# Patient Record
Sex: MALE | Race: WHITE | Employment: FULL TIME | URBAN - METROPOLITAN AREA
[De-identification: names, ages, dates, MRNs, and addresses within clinical notes are randomized per-mention and may not be internally consistent; named-entity substitution may affect disease eponyms.]

---

## 2018-03-15 ENCOUNTER — OFFICE VISIT (OUTPATIENT)
Dept: FAMILY MEDICINE CLINIC | Facility: CLINIC | Age: 28
End: 2018-03-15

## 2018-03-15 VITALS
DIASTOLIC BLOOD PRESSURE: 60 MMHG | BODY MASS INDEX: 39.2 KG/M2 | WEIGHT: 280 LBS | HEIGHT: 71 IN | TEMPERATURE: 101 F | HEART RATE: 108 BPM | SYSTOLIC BLOOD PRESSURE: 118 MMHG | RESPIRATION RATE: 20 BRPM | OXYGEN SATURATION: 97 %

## 2018-03-15 DIAGNOSIS — J03.90 TONSILLITIS WITH EXUDATE: Primary | ICD-10-CM

## 2018-03-15 DIAGNOSIS — J02.9 SORE THROAT: ICD-10-CM

## 2018-03-15 LAB — CONTROL LINE PRESENT WITH A CLEAR BACKGROUND (YES/NO): YES YES/NO

## 2018-03-15 PROCEDURE — 99213 OFFICE O/P EST LOW 20 MIN: CPT | Performed by: NURSE PRACTITIONER

## 2018-03-15 PROCEDURE — 87081 CULTURE SCREEN ONLY: CPT | Performed by: NURSE PRACTITIONER

## 2018-03-15 PROCEDURE — 87880 STREP A ASSAY W/OPTIC: CPT | Performed by: NURSE PRACTITIONER

## 2018-03-15 NOTE — PROGRESS NOTES
CHIEF COMPLAINT:   Patient presents with:  Sore Throat: x 3 days        HPI:   Gerry Haro is a 24year old male presents to clinic with complaint of sore throat. Patient has had 3 days. Symptoms have been worsening since onset.   Patient reports fol SKIN: no rashes,no suspicious lesions  HEAD: atraumatic, normocephalic  EYES: conjunctiva clear, EOM intact  EARS: TM's clear, non-injected, no bulging, retraction, or fluid bilaterally  NOSE: nostrils patent, no nasal discharge, nasal mucosa pink and not Follow up in 3-5 days for worsening symptoms or no improvement    Self-Care for Sore Throats    Sore throats happen for many reasons, such as colds, allergies, and infections caused by viruses or bacteria. In any case, your throat becomes red and sore.  You Call your healthcare provider  Contact your healthcare provider if you have:  · A temperature over 101°F (38.3°C)  · White spots on the throat  · Great difficulty swallowing  · Trouble breathing  · A skin rash  · Recent exposure to someone else with strep

## 2018-12-14 NOTE — PATIENT INSTRUCTIONS
Tylenol/ibuprofen  Follow up in 3-5 days for worsening symptoms or no improvement    Self-Care for Sore Throats    Sore throats happen for many reasons, such as colds, allergies, and infections caused by viruses or bacteria.  In any case, your throat beco · Don’t strain your vocal cords.   Call your healthcare provider  Contact your healthcare provider if you have:  · A temperature over 101°F (38.3°C)  · White spots on the throat  · Great difficulty swallowing  · Trouble breathing  · A skin rash  · Recent ex Yes

## 2019-01-08 ENCOUNTER — OFFICE VISIT (OUTPATIENT)
Dept: FAMILY MEDICINE CLINIC | Facility: CLINIC | Age: 29
End: 2019-01-08

## 2019-01-08 VITALS
RESPIRATION RATE: 16 BRPM | WEIGHT: 280 LBS | HEART RATE: 108 BPM | OXYGEN SATURATION: 97 % | BODY MASS INDEX: 39.2 KG/M2 | TEMPERATURE: 100 F | SYSTOLIC BLOOD PRESSURE: 120 MMHG | DIASTOLIC BLOOD PRESSURE: 64 MMHG | HEIGHT: 71 IN

## 2019-01-08 DIAGNOSIS — J11.1 INFLUENZA-LIKE ILLNESS: Primary | ICD-10-CM

## 2019-01-08 PROCEDURE — 99213 OFFICE O/P EST LOW 20 MIN: CPT | Performed by: NURSE PRACTITIONER

## 2019-01-08 NOTE — PROGRESS NOTES
Patient presents with:  Headache: slight cough, feels \"feverish\", fatigue, chills/sweats (day 3)  : HPI:   Toone December is a 34year old male who presents for upper respiratory symptoms since yesterday morning. Started suddenly.   Symptoms have sli NOSE: nostrils patent, clear nasal mucous, nasal mucosa mildly inflamed  THROAT: oral mucosa pink, moist. No visible dental caries. Posterior pharynx is mildly erythematous. no exudates.   NECK: supple, non-tender  LUNGS: clear to auscultation bilaterally, Symptoms of the flu may be mild or severe. They can include extreme tiredness (wanting to stay in bed all day), chills, fevers, muscle aches, soreness with eye movement, headache, and a dry, hacking cough.   Home care  Follow these guidelines when caring fo · Severe weakness or dizziness  · You get a new fever or cough after getting better for a few days  Date Last Reviewed: 1/1/2017  © 9517-6764 [employer]. [address] [facility] [place] [address] [place] [address]. All rights reserved.  This information is not

## 2020-01-24 ENCOUNTER — OFFICE VISIT (OUTPATIENT)
Dept: FAMILY MEDICINE CLINIC | Facility: CLINIC | Age: 30
End: 2020-01-24
Payer: COMMERCIAL

## 2020-01-24 VITALS
WEIGHT: 280 LBS | SYSTOLIC BLOOD PRESSURE: 122 MMHG | RESPIRATION RATE: 20 BRPM | HEIGHT: 71 IN | TEMPERATURE: 99 F | DIASTOLIC BLOOD PRESSURE: 72 MMHG | OXYGEN SATURATION: 98 % | HEART RATE: 89 BPM | BODY MASS INDEX: 39.2 KG/M2

## 2020-01-24 DIAGNOSIS — Z00.00 NORMAL EXAM: Primary | ICD-10-CM

## 2020-01-24 PROCEDURE — 99395 PREV VISIT EST AGE 18-39: CPT | Performed by: NURSE PRACTITIONER

## 2020-01-24 NOTE — PROGRESS NOTES
CHIEF COMPLAINT:     Patient presents with:  Note For Work      HPI:   Tatyana Prado is a 34year old male who presents for physical exam for return to work. Patient reports he was off of work for the last 2 days due to [place] reasons. \"  He is not bilaterally. No diminished breath sounds. No wheezing, rhonchi, or rales. CARDIO: RRR without murmur  GI: BS's present x4., No tenderness of palpation. No obvious masses or palpable organomegaly   MUSCULOSKELETAL: able to move all extremities normally.  A

## 2020-05-14 ENCOUNTER — HOSPITAL ENCOUNTER (OUTPATIENT)
Age: 30
Discharge: HOME | End: 2020-05-14
Attending: FAMILY MEDICINE
Payer: COMMERCIAL

## 2020-05-14 VITALS
OXYGEN SATURATION: 97 % | HEART RATE: 71 BPM | DIASTOLIC BLOOD PRESSURE: 70 MMHG | RESPIRATION RATE: 18 BRPM | SYSTOLIC BLOOD PRESSURE: 118 MMHG | TEMPERATURE: 98 F

## 2020-05-14 DIAGNOSIS — K52.9 GASTROENTERITIS: Primary | ICD-10-CM

## 2020-05-14 PROCEDURE — 81002 URINALYSIS NONAUTO W/O SCOPE: CPT | Performed by: FAMILY MEDICINE

## 2020-05-14 PROCEDURE — 99204 OFFICE O/P NEW MOD 45 MIN: CPT

## 2020-05-14 PROCEDURE — 99213 OFFICE O/P EST LOW 20 MIN: CPT

## 2020-05-14 RX ORDER — ONDANSETRON 4 MG/1
4 TABLET, ORALLY DISINTEGRATING ORAL EVERY 8 HOURS PRN
Qty: 10 TABLET | Refills: 0 | Status: SHIPPED | OUTPATIENT
Start: 2020-05-14 | End: 2020-05-17

## 2020-05-14 RX ORDER — ONDANSETRON 4 MG/1
8 TABLET, ORALLY DISINTEGRATING ORAL ONCE
Status: COMPLETED | OUTPATIENT
Start: 2020-05-14 | End: 2020-05-14

## 2020-05-14 NOTE — ED PROVIDER NOTES
Patient Seen in: [address] [facility]      History   Patient presents with:  Nausea/Vomiting/Diarrhea    Stated Complaint: 2 days stomach issues,diarrhea    HPI  28-year-old gentleman presents with a history of a nausea and diarrhea since not in acute distress. Appearance: He is well-developed. HENT:      Head: Normocephalic and atraumatic.       Comments: Oral mucous membrane appear moist     Right Ear: External ear normal.      Left Ear: External ear normal.      Nose: Nose normal. persistent vomiting and you are unable to keep down fluids, no urine in 8 hours, blood in emesis or stools. See your doctor in 2-3 days if not better.       Disposition:  Discharge  5/14/2020 11:10 am    Follow-up:  Jasper Snell

## 2020-05-14 NOTE — ED INITIAL ASSESSMENT (HPI)
Started having nausea and diarrhea (loose stools) since 6pm yesterday (approx 5-6 times). Denies abdominal pain or vomiting.

## 2023-12-26 ENCOUNTER — HOSPITAL ENCOUNTER (OUTPATIENT)
Age: 33
Discharge: HOME | End: 2023-12-26
Payer: COMMERCIAL

## 2023-12-26 VITALS
BODY MASS INDEX: 42 KG/M2 | HEART RATE: 88 BPM | TEMPERATURE: 97 F | OXYGEN SATURATION: 98 % | SYSTOLIC BLOOD PRESSURE: 133 MMHG | DIASTOLIC BLOOD PRESSURE: 83 MMHG | RESPIRATION RATE: 20 BRPM | WEIGHT: 300 LBS | HEIGHT: 71 IN

## 2023-12-26 DIAGNOSIS — J11.1 INFLUENZA-LIKE ILLNESS: Primary | ICD-10-CM

## 2023-12-26 LAB
S PYO AG THROAT QL IA.RAPID: NEGATIVE
SARS-COV-2 RNA RESP QL NAA+PROBE: NOT DETECTED

## 2023-12-26 PROCEDURE — 87651 STREP A DNA AMP PROBE: CPT | Performed by: NURSE PRACTITIONER

## 2023-12-26 PROCEDURE — 99202 OFFICE O/P NEW SF 15 MIN: CPT

## 2023-12-26 PROCEDURE — 99203 OFFICE O/P NEW LOW 30 MIN: CPT

## 2023-12-26 RX ORDER — METFORMIN HYDROCHLORIDE EXTENDED-RELEASE TABLETS 500 MG/1
500 TABLET, FILM COATED, EXTENDED RELEASE ORAL
COMMUNITY

## 2023-12-27 NOTE — DISCHARGE INSTRUCTIONS
Continue Sudafed. Tylenol and ibuprofen for pain or body aches. Follow-up with your primary as scheduled. Return if worse.

## (undated) NOTE — LETTER
Date: 1/8/2019    Patient Name: Severa Lints          To Whom it may concern: This letter has been written at the patient's request. The above patient was seen at the [facility] for treatment of a medical condition.     This patient nasrin

## (undated) NOTE — LETTER
Date & Time: 12/26/2023, 6:15 PM  Patient: Katerina Farr  Encounter Provider(s):    ENEDELIA Morris       To Whom It May Concern:    Katerina Farr was seen and treated in our department on 12/26/2023. He should not return to work until 12/28/23 .     If you have any questions or concerns, please do not hesitate to call.        _____________________________  Physician/APC Signature

## (undated) NOTE — LETTER
Date: 3/15/2018    Patient Name: Hemalatha Fisher          To Whom it may concern: This letter has been written at the patient's request. The above patient was seen at the [facility] for treatment of a medical condition.     The patient may

## (undated) NOTE — LETTER
Date: 1/24/2020    Patient Name: Larry Carver          To Whom it may concern: This letter has been written at the patient's request. The above patient was seen at the [facility] on 1/24/20.  He is free of signs of any communicable dise

## (undated) NOTE — LETTER
Date & Time: 5/14/2020, 10:31 AM  Patient: Alicia Bruner  Encounter Provider(s):    Sophia Degroot MD       To Whom It May Concern:    Alicia Bruner was seen and treated in our department on 5/14/2020.  He is advised off work for the next 2 days